# Patient Record
Sex: FEMALE | Race: WHITE | NOT HISPANIC OR LATINO | Employment: OTHER | URBAN - METROPOLITAN AREA
[De-identification: names, ages, dates, MRNs, and addresses within clinical notes are randomized per-mention and may not be internally consistent; named-entity substitution may affect disease eponyms.]

---

## 2018-07-05 ENCOUNTER — OFFICE VISIT (OUTPATIENT)
Dept: INTERNAL MEDICINE | Facility: CLINIC | Age: 38
End: 2018-07-05
Payer: COMMERCIAL

## 2018-07-05 VITALS
HEART RATE: 75 BPM | WEIGHT: 116.63 LBS | HEIGHT: 60 IN | SYSTOLIC BLOOD PRESSURE: 102 MMHG | TEMPERATURE: 98 F | OXYGEN SATURATION: 100 % | BODY MASS INDEX: 22.9 KG/M2 | DIASTOLIC BLOOD PRESSURE: 78 MMHG | RESPIRATION RATE: 18 BRPM

## 2018-07-05 DIAGNOSIS — F41.0 PANIC DISORDER: ICD-10-CM

## 2018-07-05 DIAGNOSIS — Z77.098: ICD-10-CM

## 2018-07-05 DIAGNOSIS — E55.9 VITAMIN D DEFICIENCY: ICD-10-CM

## 2018-07-05 DIAGNOSIS — Z13.220 SCREENING CHOLESTEROL LEVEL: ICD-10-CM

## 2018-07-05 DIAGNOSIS — E53.8 B12 DEFICIENCY: Primary | ICD-10-CM

## 2018-07-05 DIAGNOSIS — F41.9 ANXIETY: ICD-10-CM

## 2018-07-05 PROCEDURE — 3008F BODY MASS INDEX DOCD: CPT | Mod: S$GLB,,, | Performed by: FAMILY MEDICINE

## 2018-07-05 PROCEDURE — 99204 OFFICE O/P NEW MOD 45 MIN: CPT | Mod: S$GLB,,, | Performed by: FAMILY MEDICINE

## 2018-07-05 PROCEDURE — 99999 PR PBB SHADOW E&M-NEW PATIENT-LVL III: CPT | Mod: PBBFAC,,, | Performed by: FAMILY MEDICINE

## 2018-07-05 NOTE — PROGRESS NOTES
Subjective:       Patient ID: Anusha Pope is a 37 y.o. female.    Chief Complaint: Establish Care; Dizziness; and Hypotension    38 yo female here with generalized anxiety disorder with occasional panic attacks. She has been reluctant to start pharmacotherapy but has been seeing a therapist for the past 3 weeks (Mitzi Woodruff) and that is going well. She has worked on lifestyle change--she has improved sleep habits (adhering to daily bedtime/wake time), daily exercise, less stimulation/adding prayer and quiet time, increased social connection.     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 She is concerned about industrial copper exposure as a possible cause for her psychiatric symptoms of increased anxiety.     She has regular periods that are fairly heavy; menstrual cramping is worse than usual and with worsening PMS symptoms.     She has had thyroid nodules which have resolved. Due for f/u with endocrinology.        does not have a problem list on file.  Past Medical History:   Diagnosis Date    Anxiety      Past Surgical History:   Procedure Laterality Date    ADENOIDECTOMY      HERNIA REPAIR  Age 6    Umbilical Hernia    TYMPANOSTOMY TUBE PLACEMENT      wisdom teeth       Family History   Problem Relation Age of Onset    Diabetes Sister         Type 1    Arthritis Maternal Grandmother     Alcohol abuse Paternal Grandmother     Stroke Paternal Grandfather      Social History     Social History    Marital status:      Spouse name: N/A    Number of children: N/A    Years of education: N/A     Occupational History    Not on file.     Social History Main Topics    Smoking status: Never Smoker    Smokeless tobacco: Not on file    Alcohol use Not on file    Drug use: Unknown    Sexual activity: Not on file     Other Topics Concern    Not on file     Social History Narrative    No narrative on file     Review of Systems   Constitutional:  Negative for fatigue and unexpected weight change.   HENT: Negative for hearing loss and sore throat.    Eyes: Negative for pain and visual disturbance.   Respiratory: Negative for cough and shortness of breath.    Cardiovascular: Negative for chest pain and palpitations.   Gastrointestinal: Negative for abdominal pain, constipation and diarrhea.   Genitourinary: Negative for difficulty urinating, dysuria and vaginal discharge.   Musculoskeletal: Negative for arthralgias and myalgias.   Skin: Negative for rash and wound.   Neurological: Negative for light-headedness and headaches.   Hematological: Negative.    Psychiatric/Behavioral: Negative for dysphoric mood, self-injury and suicidal ideas.       Objective:     Vitals:    07/05/18 1523   BP: 102/78   Pulse: 75   Resp: 18   Temp: 98.3 °F (36.8 °C)        Physical Exam   Constitutional: She is oriented to person, place, and time. She appears well-developed and well-nourished.   HENT:   Head: Normocephalic and atraumatic.   Right Ear: External ear normal.   Left Ear: External ear normal.   Nose: Nose normal.   Eyes: Conjunctivae and EOM are normal. Pupils are equal, round, and reactive to light. No scleral icterus.   Cardiovascular: Normal rate and regular rhythm.    Pulmonary/Chest: Effort normal and breath sounds normal.   Abdominal: Soft. Bowel sounds are normal.   Neurological: She is alert and oriented to person, place, and time.   Normal gait. No speech abnormality   Skin: Skin is warm and dry.   Psychiatric: She has a normal mood and affect. Her behavior is normal. Judgment and thought content normal.   Nursing note and vitals reviewed.      Assessment:       1. B12 deficiency    2. Anxiety    3. Screening cholesterol level    4. Panic disorder    5. Vitamin D deficiency    6. Exposure to industrial toxin        Plan:           Problem List Items Addressed This Visit     None      Visit Diagnoses     B12 deficiency    -  Primary    Relevant Orders    Vitamin  B12    Anxiety        Relevant Orders    CBC auto differential    Comprehensive metabolic panel    TSH    Screening cholesterol level        Relevant Orders    Lipid panel    Panic disorder        Relevant Orders    CBC auto differential    Comprehensive metabolic panel    TSH    Vitamin D deficiency        Relevant Orders    Vitamin D    Exposure to industrial toxin        Relevant Orders    Copper, urine, 24 hour    CERULOPLASMIN    COPPER, SERUM      Labs today; she is reluctant to take anxiety medication and prefers lifestyle measures where possible. No physical exam signs of copper toxicity; will check labs and 24 hour urine copper. Recommend keeping f/u with endocrine for monitoring of thyroid nodules. Check TSH today. Requests hormone testing; she will order saliva testing through [facility].

## 2018-07-06 ENCOUNTER — LAB VISIT (OUTPATIENT)
Dept: LAB | Facility: HOSPITAL | Age: 38
End: 2018-07-06
Attending: FAMILY MEDICINE
Payer: COMMERCIAL

## 2018-07-06 DIAGNOSIS — F41.9 ANXIETY: ICD-10-CM

## 2018-07-06 DIAGNOSIS — Z13.220 SCREENING CHOLESTEROL LEVEL: ICD-10-CM

## 2018-07-06 DIAGNOSIS — Z77.098: ICD-10-CM

## 2018-07-06 DIAGNOSIS — E55.9 VITAMIN D DEFICIENCY: ICD-10-CM

## 2018-07-06 DIAGNOSIS — E53.8 B12 DEFICIENCY: ICD-10-CM

## 2018-07-06 DIAGNOSIS — F41.0 PANIC DISORDER: ICD-10-CM

## 2018-07-06 LAB
25(OH)D3+25(OH)D2 SERPL-MCNC: 38 NG/ML
ALBUMIN SERPL BCP-MCNC: 4.1 G/DL
ALP SERPL-CCNC: 56 U/L
ALT SERPL W/O P-5'-P-CCNC: 11 U/L
ANION GAP SERPL CALC-SCNC: 7 MMOL/L
AST SERPL-CCNC: 16 U/L
BASOPHILS # BLD AUTO: 0.03 K/UL
BASOPHILS NFR BLD: 0.6 %
BILIRUB SERPL-MCNC: 0.5 MG/DL
BUN SERPL-MCNC: 7 MG/DL
CALCIUM SERPL-MCNC: 9.5 MG/DL
CERULOPLASMIN SERPL-MCNC: 17 MG/DL
CHLORIDE SERPL-SCNC: 104 MMOL/L
CHOLEST SERPL-MCNC: 133 MG/DL
CHOLEST/HDLC SERPL: 2.3 {RATIO}
CO2 SERPL-SCNC: 28 MMOL/L
CREAT SERPL-MCNC: 0.8 MG/DL
DIFFERENTIAL METHOD: NORMAL
EOSINOPHIL # BLD AUTO: 0.1 K/UL
EOSINOPHIL NFR BLD: 1 %
ERYTHROCYTE [DISTWIDTH] IN BLOOD BY AUTOMATED COUNT: 13 %
EST. GFR  (AFRICAN AMERICAN): >60 ML/MIN/1.73 M^2
EST. GFR  (NON AFRICAN AMERICAN): >60 ML/MIN/1.73 M^2
GLUCOSE SERPL-MCNC: 80 MG/DL
HCT VFR BLD AUTO: 39.2 %
HDLC SERPL-MCNC: 57 MG/DL
HDLC SERPL: 42.9 %
HGB BLD-MCNC: 12.9 G/DL
IMM GRANULOCYTES # BLD AUTO: 0.01 K/UL
IMM GRANULOCYTES NFR BLD AUTO: 0.2 %
LDLC SERPL CALC-MCNC: 68.2 MG/DL
LYMPHOCYTES # BLD AUTO: 2 K/UL
LYMPHOCYTES NFR BLD: 40.5 %
MCH RBC QN AUTO: 30.6 PG
MCHC RBC AUTO-ENTMCNC: 32.9 G/DL
MCV RBC AUTO: 93 FL
MONOCYTES # BLD AUTO: 0.4 K/UL
MONOCYTES NFR BLD: 7 %
NEUTROPHILS # BLD AUTO: 2.5 K/UL
NEUTROPHILS NFR BLD: 50.7 %
NONHDLC SERPL-MCNC: 76 MG/DL
NRBC BLD-RTO: 0 /100 WBC
PLATELET # BLD AUTO: 199 K/UL
PMV BLD AUTO: 11.8 FL
POTASSIUM SERPL-SCNC: 4.2 MMOL/L
PROT SERPL-MCNC: 7.2 G/DL
RBC # BLD AUTO: 4.22 M/UL
SODIUM SERPL-SCNC: 139 MMOL/L
TRIGL SERPL-MCNC: 39 MG/DL
TSH SERPL DL<=0.005 MIU/L-ACNC: 0.6 UIU/ML
VIT B12 SERPL-MCNC: 407 PG/ML
WBC # BLD AUTO: 5.01 K/UL

## 2018-07-06 PROCEDURE — 84443 ASSAY THYROID STIM HORMONE: CPT

## 2018-07-06 PROCEDURE — 82607 VITAMIN B-12: CPT

## 2018-07-06 PROCEDURE — 82525 ASSAY OF COPPER: CPT

## 2018-07-06 PROCEDURE — 80061 LIPID PANEL: CPT

## 2018-07-06 PROCEDURE — 82306 VITAMIN D 25 HYDROXY: CPT

## 2018-07-06 PROCEDURE — 82390 ASSAY OF CERULOPLASMIN: CPT

## 2018-07-06 PROCEDURE — 82525 ASSAY OF COPPER: CPT | Mod: 91

## 2018-07-06 PROCEDURE — 80053 COMPREHEN METABOLIC PANEL: CPT

## 2018-07-06 PROCEDURE — 85025 COMPLETE CBC W/AUTO DIFF WBC: CPT

## 2018-07-09 ENCOUNTER — TELEPHONE (OUTPATIENT)
Dept: INTERNAL MEDICINE | Facility: CLINIC | Age: 38
End: 2018-07-09

## 2018-07-09 LAB
COLLECT DURATION TIME SPEC: NORMAL HR
COPPER 24H UR-MRATE: NORMAL UG/D (ref 3–45)
COPPER SERPL-MCNC: 916 UG/L (ref 810–1990)
COPPER UR-MCNC: 2.1 UG/DL (ref 0.3–3.2)
COPPER/CREAT UR: 10.3 UG/G CRT (ref 10–45)
CREAT 24H UR-MRATE: NORMAL MG/D (ref 700–1600)
CREAT UR-MCNC: 203 MG/DL
SPECIMEN VOL ?TM UR: NORMAL ML

## 2018-07-09 NOTE — TELEPHONE ENCOUNTER
Called [facility] to inform pt that  everything that has come back so far has been completley normal including her thyroid testing, copper and ceruloplasmin level. The only thing pending is her 24 hour urine copper. Her cholesterol profile is excellent! Please help her log in to [employer] so she can view her results on-line.

## 2018-07-09 NOTE — TELEPHONE ENCOUNTER
----- Message from Kinsey Ospina MD sent at 7/9/2018  3:58 PM CDT -----  Please let her know that everything that has come back so far has been completley normal including her thyroid testing, copper and ceruloplasmin level. The only thing pending is her 24 hour urine copper. Her cholesterol profile is excellent! Please help her log in to [employer] so she can view her results on-line.

## 2018-07-11 ENCOUNTER — TELEPHONE (OUTPATIENT)
Dept: INTERNAL MEDICINE | Facility: CLINIC | Age: 38
End: 2018-07-11

## 2018-07-11 NOTE — TELEPHONE ENCOUNTER
Informed pt that her urine copper results are within the normal range. Pt verbalized understanding. /srb

## 2018-07-11 NOTE — TELEPHONE ENCOUNTER
----- Message from Kinsey Ospina MD sent at 7/10/2018  8:25 AM CDT -----  Please let her know that her urine copper results are within the normal range.